# Patient Record
Sex: FEMALE | Race: WHITE
[De-identification: names, ages, dates, MRNs, and addresses within clinical notes are randomized per-mention and may not be internally consistent; named-entity substitution may affect disease eponyms.]

---

## 2022-10-31 ENCOUNTER — HOSPITAL ENCOUNTER (EMERGENCY)
Dept: HOSPITAL 76 - ED | Age: 32
Discharge: HOME | End: 2022-10-31
Payer: COMMERCIAL

## 2022-10-31 VITALS — SYSTOLIC BLOOD PRESSURE: 134 MMHG | DIASTOLIC BLOOD PRESSURE: 91 MMHG

## 2022-10-31 DIAGNOSIS — S09.90XA: ICD-10-CM

## 2022-10-31 DIAGNOSIS — R55: Primary | ICD-10-CM
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
## 2022-10-31 DIAGNOSIS — S00.01XA: ICD-10-CM
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
## 2022-10-31 LAB
ALBUMIN DIAFP-MCNC: 4.2 G/DL (ref 3.2–5.5)
ALBUMIN/GLOB SERPL: 1.5 {RATIO} (ref 1–2.2)
ALP SERPL-CCNC: 66 IU/L (ref 42–121)
ALT SERPL W P-5'-P-CCNC: 12 IU/L (ref 10–60)
ANION GAP SERPL CALCULATED.4IONS-SCNC: 11 MMOL/L (ref 6–13)
AST SERPL W P-5'-P-CCNC: 20 IU/L (ref 10–42)
BASOPHILS NFR BLD AUTO: 0 %
BASOPHILS NFR BLD AUTO: 0 10^3/UL (ref 0–0.1)
BILIRUB BLD-MCNC: < 0.2 MG/DL (ref 0.2–1)
BUN SERPL-MCNC: 16 MG/DL (ref 6–20)
CALCIUM UR-MCNC: 8.6 MG/DL (ref 8.5–10.3)
CHLORIDE SERPL-SCNC: 103 MMOL/L (ref 101–111)
CO2 SERPL-SCNC: 26 MMOL/L (ref 21–32)
CREAT SERPLBLD-SCNC: 0.6 MG/DL (ref 0.4–1)
EOSINOPHIL # BLD AUTO: 0 10^3/UL (ref 0–0.7)
EOSINOPHIL NFR BLD AUTO: 0 %
ERYTHROCYTE [DISTWIDTH] IN BLOOD BY AUTOMATED COUNT: 13.5 % (ref 12–15)
GFRSERPLBLD MDRD-ARVRAT: 117 ML/MIN/{1.73_M2} (ref 89–?)
GLOBULIN SER-MCNC: 2.8 G/DL (ref 2.1–4.2)
GLUCOSE SERPL-MCNC: 97 MG/DL (ref 70–100)
HCT VFR BLD AUTO: 38.6 % (ref 37–47)
HGB UR QL STRIP: 12.6 G/DL (ref 12–16)
LIPASE SERPL-CCNC: 37 U/L (ref 22–51)
LYMPHOCYTES # SPEC AUTO: 2.7 10^3/UL (ref 1.5–3.5)
LYMPHOCYTES NFR BLD AUTO: 31.7 %
MCH RBC QN AUTO: 30.1 PG (ref 27–31)
MCHC RBC AUTO-ENTMCNC: 32.6 G/DL (ref 32–36)
MCV RBC AUTO: 92.3 FL (ref 81–99)
MONOCYTES # BLD AUTO: 0.5 10^3/UL (ref 0–1)
MONOCYTES NFR BLD AUTO: 5.7 %
NEUTROPHILS # BLD AUTO: 5.4 10^3/UL (ref 1.5–6.6)
NEUTROPHILS # SNV AUTO: 8.6 X10^3/UL (ref 4.8–10.8)
NEUTROPHILS NFR BLD AUTO: 62.3 %
NRBC # BLD AUTO: 0 /100WBC
NRBC # BLD AUTO: 0 X10^3/UL
PDW BLD AUTO: 9.2 FL (ref 7.9–10.8)
PLATELET # BLD: 240 10^3/UL (ref 130–450)
POTASSIUM SERPL-SCNC: 3.7 MMOL/L (ref 3.5–5)
PROT SPEC-MCNC: 7 G/DL (ref 6.7–8.2)
RBC MAR: 4.18 10^6/UL (ref 4.2–5.4)
SODIUM SERPLBLD-SCNC: 140 MMOL/L (ref 135–145)

## 2022-10-31 PROCEDURE — 99284 EMERGENCY DEPT VISIT MOD MDM: CPT

## 2022-10-31 PROCEDURE — 84484 ASSAY OF TROPONIN QUANT: CPT

## 2022-10-31 PROCEDURE — 85025 COMPLETE CBC W/AUTO DIFF WBC: CPT

## 2022-10-31 PROCEDURE — 36415 COLL VENOUS BLD VENIPUNCTURE: CPT

## 2022-10-31 PROCEDURE — 93005 ELECTROCARDIOGRAM TRACING: CPT

## 2022-10-31 PROCEDURE — 80053 COMPREHEN METABOLIC PANEL: CPT

## 2022-10-31 PROCEDURE — 83690 ASSAY OF LIPASE: CPT

## 2022-10-31 NOTE — CT REPORT
PROCEDURE:  HEAD WO

 

INDICATIONS:  closed head injury

 

TECHNIQUE:  

Noncontrast 4.5 mm thick angled axial sections acquired from the foramen magnum to the vertex.  For r
adiation dose reduction, the following was used:  automated exposure control, adjustment of mA and/or
 kV according to patient size.

 

COMPARISON:  None.

 

FINDINGS:  

Image quality:  Excellent.  

 

CSF spaces:  Basal cisterns are patent.  No extra-axial fluid collections.  Ventricles are normal in 
size and shape.  

 

Brain:  No intracranial hemorrhage, mass, or mass effect.  Gray-white matter interface appears preser
cisco.

 

 Skull and face:  There is right posterior parietal scalp soft tissue swelling. Calvarium and visuali
zed facial bones are intact, without suspicious lesions.  

 

Sinuses:  Visualized sinuses and mastoids are clear.  

 

IMPRESSION:  

 

1. No acute intracranial abnormality.

 

Reviewed by: Toni Harman MD on 10/31/2022 2:34 AM PDT

Approved by: Toni Harman MD on 10/31/2022 2:34 AM PDT

 

 

Station ID:  [id number]

## 2022-10-31 NOTE — ED PHYSICIAN DOCUMENTATION
PD HPI HEAD INJURY





- Stated complaint


Stated Complaint: HEAD INJ





- Chief complaint


Chief Complaint: Neuro





- History obtained from


History obtained from: Patient, Family





- Additional information


Additional information: 


The patient is brought to the emergency department by her significant other for 

chief complaint of syncopal episode and fall.  The patient has history of 

seizure disorder and has had recent medication change from Keppra to 

oxcarbazepine.  Her doses have been slowly titrating up and the patient has 

still had some breakthrough seizures.  However, patient states she also has a 

history of syncopal episodes for unknown reasons, and this seems very much the 

same as those.  She states that the difference this time was that she was just 

getting home from a car trip and was not in a position to be able to quickly get

herself into a sitting or laying position when she began to feel lightheaded.  

The patient states that she had been feeling somewhat nauseated in the car and 

believes this set off the episode.  The patient's significant other states that 

as the patient began to go down, she was able to grab her by the clothing and 

try to catch her; however, the patient's head did lurch backward and did strike 

the hard surface floor.  The patient's wife noticed a swollen area on the back 

of the patient's head where her head hit the floor and she also noticed some 

bleeding.  She states the patient was unconscious or seemingly so for about 10 

seconds after the the fall, and then began to wake up and asked to go lay down 

on the couch.  The patient states that she does not remember the actual fall but

she remembers feeling as though she was going to lose consciousness.  She also 

remembers feeling and hearing her head hit the floor, but other details 

surrounding incident are not clear.  Patient denies any headache at this point 

in time.  She states she feels a little dizzy.  She does note that she was 

drinking plenty of water in the car on their trip back from [place] today.  The 

patient denies any recent illnesses.  She denies any other complaints at this 

time.








Review of Systems


Ten Systems: 10 systems reviewed and negative


Constitutional: reports: Reviewed and negative


Eyes: reports: Reviewed and negative


Ears: reports: Reviewed and negative


Nose: reports: Reviewed and negative


Throat: reports: Reviewed and negative


Cardiac: reports: Reviewed and negative


Respiratory: reports: Reviewed and negative


GI: reports: Reviewed and negative


: reports: Reviewed and negative


Skin: reports: Reviewed and negative


Musculoskeletal: reports: Reviewed and negative


Neurologic: reports: Syncope, Head injury, LOC


Psychiatric: reports: Reviewed and negative


Endocrine: reports: Reviewed and negative


Immunocompromised: reports: Reviewed and negative





PD PAST MEDICAL HISTORY





- Past Medical History


Past Medical History: Yes


Neuro: Migraines, Seizure disorder


Psych: Depression, Anxiety


Musculoskeletal: Fibromyalgia, Rheumatoid arthritis, Other


Other Past Medical History: Edema to LE"s





- Past Surgical History


Past Surgical History: Yes





- Present Medications


Home Medications: 


                                Ambulatory Orders











 Medication  Instructions  Recorded  Confirmed


 


Fluoxetine HCl [Prozac] 20 mg PO DAILY 10/31/22 10/31/22


 


Furosemide [Lasix] 20 mg PO TID 10/31/22 10/31/22


 


OXcarbazepine [Trileptal] 900 mg PO DAILY 10/31/22 10/31/22


 


Rizatriptan Benzoate [Rizatriptan] 10 mg PO BID PRN 10/31/22 10/31/22














- Allergies


Allergies/Adverse Reactions: 


                                    Allergies











Allergy/AdvReac Type Severity Reaction Status Date / Time


 


bupropion [From Wellbutrin] Allergy  Dizziness Verified 10/31/22 00:47


 


topiramate [From Topamax] AdvReac  Unknown Verified 10/31/22 01:16














- Social History


Does the pt smoke?: No


Smoking Status: Never smoker


Does the pt drink ETOH?: No


Does the pt have substance abuse?: No





- Immunizations


Immunizations are current?: Yes





- POLST


Patient has POLST: No





PD ED PE NORMAL





- Vitals


Vital signs reviewed: Yes





- General


General: Alert and oriented X 3, No acute distress, Well developed/nourished





- HEENT


HEENT: PERRL, EOMI, Moist mucous membranes, Other (The patient has approximately

3 cm diameter raised contusion with tiny abrasion over the apex.  No bony 

deformity.  No laceration.)





- Neck


Neck: Supple, no meningeal sign, No bony TTP





- Cardiac


Cardiac: RRR, No murmur, Strong equal pulses





- Respiratory


Respiratory: No respiratory distress, Clear bilaterally





- Abdomen


Abdomen: Soft, Non tender, Non distended





- Back


Back: No spinal TTP





- Derm


Derm: Normal color, Warm and dry, No rash





- Extremities


Extremities: No deformity, Normal ROM s pain, No edema





- Neuro


Neuro: Alert and oriented X 3, CNs 2-12 intact, No motor deficit, No sensory 

deficit, Normal speech





- Psych


Psych: Normal mood, Normal affect





Results





- Vitals


Vitals: 


                               Vital Signs - 24 hr











  10/31/22 10/31/22 10/31/22





  00:37 01:05 01:41


 


Temperature 36.6 C  


 


Heart Rate 87 91 92


 


Respiratory 18 18 16





Rate   


 


Blood Pressure 132/94 H 118/78 122/83 H


 


O2 Saturation 100 100 100














  10/31/22 10/31/22





  02:25 02:57


 


Temperature  


 


Heart Rate 91 89


 


Respiratory 20 18





Rate  


 


Blood Pressure 123/80 134/91 H


 


O2 Saturation 98 99








                                     Oxygen











O2 Source                      Room air

















- EKG (time done)


  ** 0054


Rate: Rate (enter#) (93)


Rhythm: NSR


Axis: Normal


Intervals: Normal MS


QRS: Normal


Ischemia: Normal ST segments


Compare to prior EKG: Old EKG unavailable


Computer interpretation: Agree with computer





- Labs


Labs: 


                                Laboratory Tests











  10/31/22 10/31/22 10/31/22





  00:49 00:49 00:49


 


WBC  8.6  


 


RBC  4.18 L  


 


Hgb  12.6  


 


Hct  38.6  


 


MCV  92.3  


 


MCH  30.1  


 


MCHC  32.6  


 


RDW  13.5  


 


Plt Count  240  


 


MPV  9.2  


 


Neut # (Auto)  5.4  


 


Lymph # (Auto)  2.7  


 


Mono # (Auto)  0.5  


 


Eos # (Auto)  0.0  


 


Baso # (Auto)  0.0  


 


Absolute Nucleated RBC  0.00  


 


Nucleated RBC %  0.0  


 


Sodium   140 


 


Potassium   3.7 


 


Chloride   103 


 


Carbon Dioxide   26 


 


Anion Gap   11.0 


 


BUN   16 


 


Creatinine   0.6 


 


Estimated GFR (MDRD)   117 


 


Glucose   97 


 


POC Whole Bld Glucose   


 


Calcium   8.6 


 


Total Bilirubin   < 0.2 L 


 


AST   20 


 


ALT   12 


 


Alkaline Phosphatase   66 


 


Troponin I High Sens    < 2.3 L


 


Total Protein   7.0 


 


Albumin   4.2 


 


Globulin   2.8 


 


Albumin/Globulin Ratio   1.5 


 


Lipase   37 














  10/31/22





  00:51


 


WBC 


 


RBC 


 


Hgb 


 


Hct 


 


MCV 


 


MCH 


 


MCHC 


 


RDW 


 


Plt Count 


 


MPV 


 


Neut # (Auto) 


 


Lymph # (Auto) 


 


Mono # (Auto) 


 


Eos # (Auto) 


 


Baso # (Auto) 


 


Absolute Nucleated RBC 


 


Nucleated RBC % 


 


Sodium 


 


Potassium 


 


Chloride 


 


Carbon Dioxide 


 


Anion Gap 


 


BUN 


 


Creatinine 


 


Estimated GFR (MDRD) 


 


Glucose 


 


POC Whole Bld Glucose  92


 


Calcium 


 


Total Bilirubin 


 


AST 


 


ALT 


 


Alkaline Phosphatase 


 


Troponin I High Sens 


 


Total Protein 


 


Albumin 


 


Globulin 


 


Albumin/Globulin Ratio 


 


Lipase 














- Rads (name of study)


  ** CT head


Radiology: Final report received, EMP read indepedently, See rad report 

(Negative)





PD MEDICAL DECISION MAKING





- ED course


Complexity details: reviewed results, re-evaluated patient, considered 

differential, d/w patient, d/w family


ED course: 


The patient was treated with a liter of IV fluid and worked up with labs, EKG, 

and head CT, all of which were unremarkable.  Patient was deemed stable for 

discharge home.  She was stable in the emergency department without any 

worsening symptoms or development of new concerning symptoms.  We have discussed

the usual indications for return.








Departure





- Departure


Disposition: 01 Home, Self Care


Clinical Impression: 


Episode of syncope


Qualifiers:


 Syncope type: vasovagal syncope Qualified Code(s): R55 - Syncope and collapse





Closed head injury


Qualifiers:


 Encounter type: initial encounter Qualified Code(s): S09.90XA - Unspecified 

injury of head, initial encounter





Condition: Stable


Instructions:  ED Head Injury Closed, ED Syncope Vasovagal


Comments: 


Your tests, including your blood work, EKG, and CT scan of the head, look good. 

You do not have a cut overlying the bruised area on your scalp, but only a 

scrape.  You have been treated with IV fluids tonight.  Please be sure to 

continue to drink plenty of fluids at home and get rest.  You may follow-up with

your primary care physician as needed.


Discharge Date/Time: 10/31/22 03:04

## 2023-01-10 ENCOUNTER — HOSPITAL ENCOUNTER (EMERGENCY)
Age: 33
Discharge: HOME | End: 2023-01-10
Payer: OTHER GOVERNMENT

## 2023-01-10 VITALS
RESPIRATION RATE: 20 BRPM | SYSTOLIC BLOOD PRESSURE: 136 MMHG | DIASTOLIC BLOOD PRESSURE: 89 MMHG | TEMPERATURE: 99.1 F | HEART RATE: 78 BPM | OXYGEN SATURATION: 98 %

## 2023-01-10 VITALS — DIASTOLIC BLOOD PRESSURE: 74 MMHG | SYSTOLIC BLOOD PRESSURE: 126 MMHG

## 2023-01-10 VITALS — OXYGEN SATURATION: 99 % | HEART RATE: 72 BPM

## 2023-01-10 DIAGNOSIS — U07.1: Primary | ICD-10-CM

## 2023-01-10 DIAGNOSIS — H66.92: ICD-10-CM

## 2023-01-10 LAB
COVID-19 CEPHEID 4-PLEX PCR: POSITIVE
FLUAV RNA UPPER RESP QL NAA+PROBE: (no result)
FLUBV RNA UPPER RESP QL NAA+PROBE: (no result)

## 2023-01-10 PROCEDURE — 99282 EMERGENCY DEPT VISIT SF MDM: CPT

## 2023-01-10 PROCEDURE — 99281 EMR DPT VST MAYX REQ PHY/QHP: CPT

## 2023-01-10 PROCEDURE — 0241U: CPT

## 2023-09-11 ENCOUNTER — HOSPITAL ENCOUNTER (EMERGENCY)
Age: 33
LOS: 1 days | Discharge: HOME | End: 2023-09-12
Payer: OTHER GOVERNMENT

## 2023-09-11 VITALS — OXYGEN SATURATION: 99 % | HEART RATE: 74 BPM

## 2023-09-11 VITALS — HEART RATE: 70 BPM | OXYGEN SATURATION: 100 %

## 2023-09-11 VITALS — OXYGEN SATURATION: 98 % | HEART RATE: 72 BPM | DIASTOLIC BLOOD PRESSURE: 78 MMHG | SYSTOLIC BLOOD PRESSURE: 127 MMHG

## 2023-09-11 VITALS — OXYGEN SATURATION: 99 % | SYSTOLIC BLOOD PRESSURE: 131 MMHG | DIASTOLIC BLOOD PRESSURE: 78 MMHG | HEART RATE: 70 BPM

## 2023-09-11 VITALS — OXYGEN SATURATION: 97 % | HEART RATE: 75 BPM | DIASTOLIC BLOOD PRESSURE: 78 MMHG | SYSTOLIC BLOOD PRESSURE: 127 MMHG

## 2023-09-11 VITALS — SYSTOLIC BLOOD PRESSURE: 123 MMHG | HEART RATE: 74 BPM | DIASTOLIC BLOOD PRESSURE: 90 MMHG | OXYGEN SATURATION: 98 %

## 2023-09-11 VITALS
SYSTOLIC BLOOD PRESSURE: 130 MMHG | RESPIRATION RATE: 17 BRPM | DIASTOLIC BLOOD PRESSURE: 83 MMHG | HEART RATE: 79 BPM | OXYGEN SATURATION: 98 % | TEMPERATURE: 98.8 F

## 2023-09-11 VITALS — DIASTOLIC BLOOD PRESSURE: 77 MMHG | HEART RATE: 72 BPM | OXYGEN SATURATION: 99 % | SYSTOLIC BLOOD PRESSURE: 123 MMHG

## 2023-09-11 VITALS — SYSTOLIC BLOOD PRESSURE: 123 MMHG | DIASTOLIC BLOOD PRESSURE: 78 MMHG | HEART RATE: 72 BPM | OXYGEN SATURATION: 100 %

## 2023-09-11 VITALS — BODY MASS INDEX: 38.7 KG/M2

## 2023-09-11 DIAGNOSIS — R10.10: Primary | ICD-10-CM

## 2023-09-11 LAB
ADD MANUAL DIFF / SLIDE REVIEW: NO
ALBUMIN SERPL-MCNC: 4.4 G/DL (ref 3.5–5)
ALBUMIN/GLOB SERPL: 1.4 {RATIO} (ref 1–2.8)
ALP SERPL-CCNC: 103 U/L (ref 38–126)
ALT SERPL-CCNC: 11 IU/L (ref ?–35)
BUN SERPL-MCNC: 12 MG/DL (ref 7–17)
CALCIUM SERPL-MCNC: 9 MG/DL (ref 8.4–10.2)
CHLORIDE SERPL-SCNC: 104 MMOL/L (ref 98–107)
CO2 SERPL-SCNC: 24 MMOL/L (ref 22–32)
ESTIMATED GLOMERULAR FILT RATE: > 60 ML/MIN (ref 60–?)
GLOBULIN SER CALC-MCNC: 3.1 G/DL (ref 1.7–4.1)
GLUCOSE SERPL-MCNC: 86 MG/DL (ref 70–100)
HEMATOCRIT: 40 % (ref 36–46)
HEMOGLOBIN: 13.5 G/DL (ref 12–16)
HEMOLYSIS: 19 (ref 0–50)
LIPASE SERPL-CCNC: 74 U/L (ref 23–300)
LYMPHOCYTES # SPEC AUTO: 1700 /UL (ref 1100–4500)
MCV RBC: 89.2 FL (ref 80–100)
MEAN CORPUSCULAR HEMOGLOBIN: 30.3 PG (ref 26–34)
MEAN CORPUSCULAR HGB CONC: 33.9 % (ref 30–36)
PLATELET COUNT: 244 X10^3/UL (ref 150–400)
POTASSIUM SERPL-SCNC: 4.2 MMOL/L (ref 3.4–5.1)
PROT SERPL-MCNC: 7.5 G/DL (ref 6.3–8.2)
SODIUM SERPL-SCNC: 136 MMOL/L (ref 137–145)

## 2023-09-11 PROCEDURE — 76705 ECHO EXAM OF ABDOMEN: CPT

## 2023-09-11 PROCEDURE — 99284 EMERGENCY DEPT VISIT MOD MDM: CPT

## 2023-09-11 PROCEDURE — 85025 COMPLETE CBC W/AUTO DIFF WBC: CPT

## 2023-09-11 PROCEDURE — 83690 ASSAY OF LIPASE: CPT

## 2023-09-11 PROCEDURE — 81025 URINE PREGNANCY TEST: CPT

## 2023-09-11 PROCEDURE — 36415 COLL VENOUS BLD VENIPUNCTURE: CPT

## 2023-09-11 PROCEDURE — 80053 COMPREHEN METABOLIC PANEL: CPT

## 2023-09-11 PROCEDURE — 81003 URINALYSIS AUTO W/O SCOPE: CPT

## 2023-09-12 VITALS — HEART RATE: 75 BPM | OXYGEN SATURATION: 98 %

## 2023-09-22 ENCOUNTER — HOSPITAL ENCOUNTER (EMERGENCY)
Age: 33
LOS: 1 days | Discharge: HOME | End: 2023-09-23
Payer: OTHER GOVERNMENT

## 2023-09-22 VITALS — DIASTOLIC BLOOD PRESSURE: 85 MMHG | OXYGEN SATURATION: 98 % | HEART RATE: 72 BPM | SYSTOLIC BLOOD PRESSURE: 155 MMHG

## 2023-09-22 VITALS
DIASTOLIC BLOOD PRESSURE: 82 MMHG | SYSTOLIC BLOOD PRESSURE: 139 MMHG | TEMPERATURE: 98.96 F | HEART RATE: 82 BPM | RESPIRATION RATE: 22 BRPM | OXYGEN SATURATION: 98 %

## 2023-09-22 VITALS — OXYGEN SATURATION: 98 % | HEART RATE: 73 BPM

## 2023-09-22 VITALS
SYSTOLIC BLOOD PRESSURE: 155 MMHG | OXYGEN SATURATION: 98 % | RESPIRATION RATE: 16 BRPM | HEART RATE: 75 BPM | DIASTOLIC BLOOD PRESSURE: 85 MMHG

## 2023-09-22 VITALS — BODY MASS INDEX: 38.7 KG/M2
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
## 2023-09-22 DIAGNOSIS — S52.121A: Primary | ICD-10-CM
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
## 2023-09-22 PROCEDURE — 73120 X-RAY EXAM OF HAND: CPT

## 2023-09-22 PROCEDURE — 73090 X-RAY EXAM OF FOREARM: CPT

## 2023-09-22 PROCEDURE — 73080 X-RAY EXAM OF ELBOW: CPT

## 2023-09-22 PROCEDURE — 73100 X-RAY EXAM OF WRIST: CPT

## 2023-09-22 PROCEDURE — 99283 EMERGENCY DEPT VISIT LOW MDM: CPT
